# Patient Record
Sex: FEMALE | Race: OTHER | HISPANIC OR LATINO
[De-identification: names, ages, dates, MRNs, and addresses within clinical notes are randomized per-mention and may not be internally consistent; named-entity substitution may affect disease eponyms.]

---

## 2023-08-14 ENCOUNTER — APPOINTMENT (OUTPATIENT)
Dept: OBGYN | Facility: CLINIC | Age: 38
End: 2023-08-14
Payer: SELF-PAY

## 2023-08-14 ENCOUNTER — LABORATORY RESULT (OUTPATIENT)
Age: 38
End: 2023-08-14

## 2023-08-14 ENCOUNTER — OUTPATIENT (OUTPATIENT)
Dept: OUTPATIENT SERVICES | Facility: HOSPITAL | Age: 38
LOS: 1 days | End: 2023-08-14
Payer: SELF-PAY

## 2023-08-14 VITALS
DIASTOLIC BLOOD PRESSURE: 82 MMHG | BODY MASS INDEX: 32.59 KG/M2 | SYSTOLIC BLOOD PRESSURE: 124 MMHG | WEIGHT: 166 LBS | HEIGHT: 60 IN

## 2023-08-14 DIAGNOSIS — N76.0 ACUTE VAGINITIS: ICD-10-CM

## 2023-08-14 DIAGNOSIS — Z01.419 ENCOUNTER FOR GYNECOLOGICAL EXAMINATION (GENERAL) (ROUTINE) W/OUT ABNORMAL FINDINGS: ICD-10-CM

## 2023-08-14 PROBLEM — Z00.00 ENCOUNTER FOR PREVENTIVE HEALTH EXAMINATION: Status: ACTIVE | Noted: 2023-08-14

## 2023-08-14 PROCEDURE — 87491 CHLMYD TRACH DNA AMP PROBE: CPT

## 2023-08-14 PROCEDURE — T1013: CPT

## 2023-08-14 PROCEDURE — 87624 HPV HI-RISK TYP POOLED RSLT: CPT

## 2023-08-14 PROCEDURE — 99202 OFFICE O/P NEW SF 15 MIN: CPT | Mod: 25,GE

## 2023-08-14 PROCEDURE — G0463: CPT

## 2023-08-14 PROCEDURE — 90471 IMMUNIZATION ADMIN: CPT | Mod: NC,GE

## 2023-08-14 PROCEDURE — 81025 URINE PREGNANCY TEST: CPT

## 2023-08-14 PROCEDURE — 90651 9VHPV VACCINE 2/3 DOSE IM: CPT

## 2023-08-14 PROCEDURE — 87591 N.GONORRHOEAE DNA AMP PROB: CPT

## 2023-08-14 PROCEDURE — 90471 IMMUNIZATION ADMIN: CPT

## 2023-08-14 PROCEDURE — 88175 CYTOPATH C/V AUTO FLUID REDO: CPT

## 2023-08-15 LAB
C TRACH RRNA SPEC QL NAA+PROBE: SIGNIFICANT CHANGE UP
HPV HIGH+LOW RISK DNA PNL CVX: SIGNIFICANT CHANGE UP
N GONORRHOEA RRNA SPEC QL NAA+PROBE: SIGNIFICANT CHANGE UP
SPECIMEN SOURCE: SIGNIFICANT CHANGE UP

## 2023-08-15 NOTE — HISTORY OF PRESENT ILLNESS
[FreeTextEntry1] : 38yo  (LMP 8/9) presenting to hospitals care. Patient overall feels well today. Denies fevers/chills, n/v, CP, SOB, abdominal pain, pelvic pain, vaginal discharge, dysuria, changes in bowel habits.  Healthcare maintenance - Pap smear - patient states she had it done last year, doesn't have results with her - STI testing - patient declines today - Gardasil vaccine - patient interested in vaccine series  OBHx:  - FT  x2 GynHx: reports hx of +HPV w/ condylomas. Sexually active with 1 male partner. Reports monthly menses, denies intermenstrual bleeding or dysmenorrhea. Denies hx of fibroids, polyps, ovarian cysts, other STIs PMHx: Denies PSHx: BTL () SHx: Denies alcohol, tobacco, or other drug use FHx: Denies family history of breast, ovarian, or colon cancer

## 2023-08-15 NOTE — REASON FOR VISIT
[Initial] : an initial consultation for [[employer] ] : provided by [employer]   [Time Spent: ____ minutes] : Total time spent using  services: [unfilled] minutes. The patient's primary language is not English thus required  services. [Interpreters_IDNumber] : [id number] [TWNoteComboBox1] : [place]

## 2023-08-15 NOTE — PHYSICAL EXAM
[Chaperone Declined] : Patient declined chaperone [Appropriately responsive] : appropriately responsive [Alert] : alert [No Acute Distress] : no acute distress [No Lymphadenopathy] : no lymphadenopathy [Soft] : soft [Non-tender] : non-tender [Non-distended] : non-distended [No Lesions] : no lesions [No Mass] : no mass [Oriented x3] : oriented x3 [Examination Of The Breasts] : a normal appearance [No Masses] : no breast masses were palpable [Labia Majora] : normal [Labia Minora] : normal [Normal] : normal [Uterine Adnexae] : normal [FreeTextEntry4] : clinically well perfused [FreeTextEntry5] : unlabored respirations

## 2023-08-15 NOTE — DISCUSSION/SUMMARY
[FreeTextEntry1] : 36yo  (LMP 8/9) presenting to establish care. Patient overall feels well today.  #Healthcare maintenance - f/u Pap/HPV and GC/CT collected today () - Gardasil #1 given today (UPT negative)  RTC in 2 months for Gardasil #2 D/w Dr. Mariano Villalpando, PGY-2

## 2023-08-16 LAB — CYTOLOGY SPEC DOC CYTO: SIGNIFICANT CHANGE UP

## 2023-08-24 DIAGNOSIS — Z23 ENCOUNTER FOR IMMUNIZATION: ICD-10-CM

## 2023-08-24 DIAGNOSIS — Z01.419 ENCOUNTER FOR GYNECOLOGICAL EXAMINATION (GENERAL) (ROUTINE) WITHOUT ABNORMAL FINDINGS: ICD-10-CM

## 2023-10-20 ENCOUNTER — MED ADMIN CHARGE (OUTPATIENT)
Age: 38
End: 2023-10-20

## 2023-10-20 ENCOUNTER — APPOINTMENT (OUTPATIENT)
Dept: OBGYN | Facility: CLINIC | Age: 38
End: 2023-10-20
Payer: SELF-PAY

## 2023-10-20 ENCOUNTER — OUTPATIENT (OUTPATIENT)
Dept: OUTPATIENT SERVICES | Facility: HOSPITAL | Age: 38
LOS: 1 days | End: 2023-10-20
Payer: SELF-PAY

## 2023-10-20 DIAGNOSIS — N76.0 ACUTE VAGINITIS: ICD-10-CM

## 2023-10-20 PROCEDURE — 81025 URINE PREGNANCY TEST: CPT

## 2023-10-20 PROCEDURE — 90471 IMMUNIZATION ADMIN: CPT | Mod: NC

## 2023-10-20 PROCEDURE — 90651 9VHPV VACCINE 2/3 DOSE IM: CPT

## 2023-10-20 PROCEDURE — 90471 IMMUNIZATION ADMIN: CPT

## 2023-11-02 DIAGNOSIS — Z23 ENCOUNTER FOR IMMUNIZATION: ICD-10-CM

## 2024-02-21 DIAGNOSIS — Z23 ENCOUNTER FOR IMMUNIZATION: ICD-10-CM

## 2024-02-23 ENCOUNTER — APPOINTMENT (OUTPATIENT)
Dept: OBGYN | Facility: CLINIC | Age: 39
End: 2024-02-23

## 2024-02-23 ENCOUNTER — MED ADMIN CHARGE (OUTPATIENT)
Age: 39
End: 2024-02-23

## 2024-02-23 ENCOUNTER — OUTPATIENT (OUTPATIENT)
Dept: OUTPATIENT SERVICES | Facility: HOSPITAL | Age: 39
LOS: 1 days | End: 2024-02-23
Payer: SELF-PAY

## 2024-02-23 DIAGNOSIS — N76.0 ACUTE VAGINITIS: ICD-10-CM

## 2024-02-23 PROCEDURE — 90471 IMMUNIZATION ADMIN: CPT

## 2024-02-23 PROCEDURE — 90651 9VHPV VACCINE 2/3 DOSE IM: CPT

## 2024-02-23 PROCEDURE — 81025 URINE PREGNANCY TEST: CPT

## 2024-02-26 DIAGNOSIS — Z23 ENCOUNTER FOR IMMUNIZATION: ICD-10-CM

## 2024-08-16 ENCOUNTER — APPOINTMENT (OUTPATIENT)
Dept: OBGYN | Facility: CLINIC | Age: 39
End: 2024-08-16

## 2024-08-16 ENCOUNTER — LABORATORY RESULT (OUTPATIENT)
Age: 39
End: 2024-08-16

## 2024-08-16 VITALS — WEIGHT: 158 LBS | DIASTOLIC BLOOD PRESSURE: 80 MMHG | SYSTOLIC BLOOD PRESSURE: 122 MMHG | BODY MASS INDEX: 30.86 KG/M2

## 2024-08-16 DIAGNOSIS — B96.89 ACUTE VAGINITIS: ICD-10-CM

## 2024-08-16 DIAGNOSIS — Z01.419 ENCOUNTER FOR GYNECOLOGICAL EXAMINATION (GENERAL) (ROUTINE) W/OUT ABNORMAL FINDINGS: ICD-10-CM

## 2024-08-16 DIAGNOSIS — N76.0 ACUTE VAGINITIS: ICD-10-CM

## 2024-08-16 DIAGNOSIS — N89.8 OTHER SPECIFIED NONINFLAMMATORY DISORDERS OF VAGINA: ICD-10-CM

## 2024-08-16 PROCEDURE — G0444 DEPRESSION SCREEN ANNUAL: CPT | Mod: 59

## 2024-08-16 PROCEDURE — 99395 PREV VISIT EST AGE 18-39: CPT

## 2024-08-16 NOTE — HISTORY OF PRESENT ILLNESS
[1 Year Ago] : 1 year ago [Good] : being in good health [Healthy Diet] : a healthy diet [Regular Exercise] : regular exercise [Weight Concerns] : no concerns with her weight [Last Pap ___] : Last cervical pap smear was [unfilled] [Reproductive Age] : is of reproductive age [Menstrual Problems] : reports normal menses [Pregnancy History] : pregnancy history: [Up to Date] : not up to date with ~his/her~ STD screening [de-identified] : s/p permanent sterilization [de-identified] : pt declined testing today [Definite:  ___ (Date)] : the last menstrual period was [unfilled] [Normal Amount/Duration] : was of a normal amount and duration [Spotting Between  Menses] : no spotting between menses [Regular Cycle Intervals] : periods have been regular [Menstrual Cramps] : no menstrual cramps [Sexually Active] : is sexually active [Monogamous] : is monogamous [Contraception] : uses contraception [Tubal Ligation] : has had a tubal ligation

## 2024-08-16 NOTE — PHYSICAL EXAM
[Chaperone Declined] : Patient declined chaperone [Awake] : awake [Alert] : alert [Acute Distress] : no acute distress [Mass] : no breast mass [Nipple Discharge] : no nipple discharge [Axillary LAD] : no axillary lymphadenopathy [Soft] : soft [Tender] : non tender [Oriented x3] : oriented to person, place, and time [Normal] : uterus [Labia Majora] : labia major [Labia Minora] : labia minora [Discharge] : a  ~M vaginal discharge was present [Moderate] : moderate [Foul Smelling] : not foul smelling [White] : white [Thick] : thick [No Bleeding] : there was no active vaginal bleeding [Pap Obtained] : a Pap smear was performed [Motion Tenderness] : there was no cervical motion tenderness [Uterine Adnexae] : were not tender and not enlarged

## 2024-08-16 NOTE — BEGINNING OF VISIT
[Patient] : patient [] :  [[employer] ] : provided by [employer]   [Time Spent: ____ minutes] : Total time spent using  services: [unfilled] minutes. The patient's primary language is not English thus required  services. [FreeTextEntry3] : pacific  used for part of visit, pt able to communicate in some English as well [TWNoteComboBox1] : [place]

## 2024-08-19 PROBLEM — N76.0 BACTERIAL VAGINOSIS: Status: ACTIVE | Noted: 2024-08-19 | Resolved: 2024-09-18

## 2024-08-19 RX ORDER — METRONIDAZOLE 7.5 MG/G
0.75 GEL VAGINAL
Qty: 1 | Refills: 3 | Status: ACTIVE | COMMUNITY
Start: 2024-08-19 | End: 1900-01-01